# Patient Record
Sex: MALE | Race: WHITE
[De-identification: names, ages, dates, MRNs, and addresses within clinical notes are randomized per-mention and may not be internally consistent; named-entity substitution may affect disease eponyms.]

---

## 2020-09-03 ENCOUNTER — HOSPITAL ENCOUNTER (OUTPATIENT)
Dept: HOSPITAL 47 - RADMRIMAIN | Age: 55
Discharge: HOME | End: 2020-09-03
Attending: ORTHOPAEDIC SURGERY
Payer: COMMERCIAL

## 2020-09-03 DIAGNOSIS — M75.121: Primary | ICD-10-CM

## 2020-09-03 NOTE — MR
EXAMINATION TYPE: MR shoulder RT wo con

 

DATE OF EXAM: 9/3/2020 2:26 PM

 

COMPARISON: NONE

 

HISTORY: Right Shoulder Pain

 

TECHNIQUE: Multiplanar multispin echo imaging of the right shoulder was performed.

 

FINDINGS:

Rotator cuff : There is complete retracted tear of the supraspinatus tendon which has a chronic appea
mario. There is atrophy of the supraspinatus musculature. There is elevation of the humeral head rela
tive to the central glenoid axis. Marked narrowing subacromial space. Partial tear infraspinatus tend
on.

 

Bursa: No bursal effusion or thickening is seen.

 

Musculature: No muscular tear identified.

 

Acromioclavicular joint : Marked narrowing subacromial joint space. Subacromial spurring. Moderate AC
 joint arthropathy.

 

Osseous structures : There are no fractures or regions of abnormal bone marrow signal intensity.

 

Long biceps tendon : The biceps tendon is normally situated within the bicipital groove. No complete 
or partial biceps tendon tear is present.

 

Glenohumeral Joint fluid : There is no glenohumeral joint effusion.

 

Cartilage and Bone : No focal hyaline cartilage defects are noted. No Hill-Sachs, reverse Hill-Sachs,
 or bony Bankart lesions are seen.

 

Labrum : There are no SLAP or soft tissue Bankart lesions. No paralabral cysts are seen.

 

OTHER FINDINGS : none

 

IMPRESSION:

 

1. 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

## 2020-09-11 ENCOUNTER — HOSPITAL ENCOUNTER (OUTPATIENT)
Dept: HOSPITAL 47 - LABPAT | Age: 55
Discharge: HOME | End: 2020-09-11
Attending: ORTHOPAEDIC SURGERY
Payer: COMMERCIAL

## 2020-09-11 DIAGNOSIS — Z01.812: ICD-10-CM

## 2020-09-11 DIAGNOSIS — Z01.818: Primary | ICD-10-CM

## 2020-09-11 DIAGNOSIS — M17.11: ICD-10-CM

## 2020-09-11 DIAGNOSIS — Z79.01: ICD-10-CM

## 2020-09-11 DIAGNOSIS — Z51.81: ICD-10-CM

## 2020-09-11 LAB
ANION GAP SERPL CALC-SCNC: 6 MMOL/L
BASOPHILS # BLD AUTO: 0.1 K/UL (ref 0–0.2)
BASOPHILS NFR BLD AUTO: 2 %
CHLORIDE SERPL-SCNC: 104 MMOL/L (ref 98–107)
CO2 SERPL-SCNC: 27 MMOL/L (ref 22–30)
EOSINOPHIL # BLD AUTO: 0.1 K/UL (ref 0–0.7)
EOSINOPHIL NFR BLD AUTO: 2 %
ERYTHROCYTE [DISTWIDTH] IN BLOOD BY AUTOMATED COUNT: 4.99 M/UL (ref 4.3–5.9)
ERYTHROCYTE [DISTWIDTH] IN BLOOD: 12.3 % (ref 11.5–15.5)
HCT VFR BLD AUTO: 47.3 % (ref 39–53)
HGB BLD-MCNC: 15.6 GM/DL (ref 13–17.5)
INR PPP: 1 (ref ?–1.2)
LYMPHOCYTES # SPEC AUTO: 1.9 K/UL (ref 1–4.8)
LYMPHOCYTES NFR SPEC AUTO: 28 %
MCH RBC QN AUTO: 31.2 PG (ref 25–35)
MCHC RBC AUTO-ENTMCNC: 33 G/DL (ref 31–37)
MCV RBC AUTO: 94.7 FL (ref 80–100)
MONOCYTES # BLD AUTO: 0.4 K/UL (ref 0–1)
MONOCYTES NFR BLD AUTO: 5 %
NEUTROPHILS # BLD AUTO: 4.2 K/UL (ref 1.3–7.7)
NEUTROPHILS NFR BLD AUTO: 61 %
PLATELET # BLD AUTO: 294 K/UL (ref 150–450)
POTASSIUM SERPL-SCNC: 4.3 MMOL/L (ref 3.5–5.1)
PT BLD: 10.2 SEC (ref 9–12)
SODIUM SERPL-SCNC: 137 MMOL/L (ref 137–145)
WBC # BLD AUTO: 6.8 K/UL (ref 3.8–10.6)

## 2020-09-11 PROCEDURE — 80051 ELECTROLYTE PANEL: CPT

## 2020-09-11 PROCEDURE — 87070 CULTURE OTHR SPECIMN AEROBIC: CPT

## 2020-09-11 PROCEDURE — 93005 ELECTROCARDIOGRAM TRACING: CPT

## 2020-09-11 PROCEDURE — 85025 COMPLETE CBC W/AUTO DIFF WBC: CPT

## 2020-09-11 PROCEDURE — 85610 PROTHROMBIN TIME: CPT

## 2020-09-23 ENCOUNTER — HOSPITAL ENCOUNTER (OUTPATIENT)
Dept: HOSPITAL 47 - LABPAT | Age: 55
Discharge: HOME | End: 2020-09-23
Attending: ORTHOPAEDIC SURGERY
Payer: COMMERCIAL

## 2020-09-23 DIAGNOSIS — Z01.818: Primary | ICD-10-CM

## 2020-09-23 DIAGNOSIS — M17.11: ICD-10-CM

## 2020-09-23 PROCEDURE — 93005 ELECTROCARDIOGRAM TRACING: CPT

## 2020-09-29 NOTE — HP
HISTORY AND PHYSICAL



DATE OF SURGERY:

09/30/2020



Fawad Lawrence is a 54-year-old patient seen with progressive right knee pain.  We

discussed options.  He elected to proceed with right total knee arthroplasty.  Consent

regarding procedure was obtained.



PAST MEDICAL HISTORY:

Hypertension.



PAST SURGICAL HISTORY:

Right knee arthroscopy.



DAILY MEDICATIONS:

None.



ALLERGIES:

None.



SOCIAL HISTORY:

Denies tobacco use.



PHYSICAL EVALUATION OF THE RIGHT KNEE:

Range of motion is -2/3 to 130.  Mild effusion.  Tenderness medial joint line.

Crepitus medial patellofemoral compartments with range of motion.  Pain with

patellofemoral compression.  Ligaments stable.  Hip rotation without pain.  His distal

neurovascular exam is intact.



RADIOGRAPHS:

Of the right knee reveal severe osteoarthritic changes.



IMPRESSION:

Right knee osteoarthritis.



PLAN:

Right total knee arthroplasty.





MMODL / IJN: [id number] / Job#: [id number]

## 2020-09-30 ENCOUNTER — HOSPITAL ENCOUNTER (OUTPATIENT)
Dept: HOSPITAL 47 - OR | Age: 55
Discharge: HOME HEALTH | End: 2020-09-30
Attending: ORTHOPAEDIC SURGERY
Payer: COMMERCIAL

## 2020-09-30 VITALS — SYSTOLIC BLOOD PRESSURE: 107 MMHG | HEART RATE: 56 BPM | DIASTOLIC BLOOD PRESSURE: 69 MMHG | RESPIRATION RATE: 18 BRPM

## 2020-09-30 VITALS — TEMPERATURE: 97.9 F

## 2020-09-30 VITALS — BODY MASS INDEX: 26.6 KG/M2

## 2020-09-30 DIAGNOSIS — I10: ICD-10-CM

## 2020-09-30 DIAGNOSIS — M17.11: Primary | ICD-10-CM

## 2020-09-30 PROCEDURE — 73560 X-RAY EXAM OF KNEE 1 OR 2: CPT

## 2020-09-30 PROCEDURE — 64448 NJX AA&/STRD FEM NRV NFS IMG: CPT

## 2020-09-30 PROCEDURE — 97161 PT EVAL LOW COMPLEX 20 MIN: CPT

## 2020-09-30 PROCEDURE — 97110 THERAPEUTIC EXERCISES: CPT

## 2020-09-30 PROCEDURE — 88300 SURGICAL PATH GROSS: CPT

## 2020-09-30 PROCEDURE — 76942 ECHO GUIDE FOR BIOPSY: CPT

## 2020-09-30 PROCEDURE — 27447 TOTAL KNEE ARTHROPLASTY: CPT

## 2020-09-30 RX ADMIN — HYDROMORPHONE HYDROCHLORIDE ONE MG: 1 INJECTION, SOLUTION INTRAMUSCULAR; INTRAVENOUS; SUBCUTANEOUS at 10:11

## 2020-09-30 RX ADMIN — HYDROMORPHONE HYDROCHLORIDE ONE MG: 1 INJECTION, SOLUTION INTRAMUSCULAR; INTRAVENOUS; SUBCUTANEOUS at 10:05

## 2020-09-30 NOTE — P.OP
Date of Procedure: 09/30/20


Preoperative Diagnosis: 


Right knee osteoarthritis


Postoperative Diagnosis: 


Right knee osteoarthritis


Procedure(s) Performed: 


Right total knee arthroplasty


Implants: 


1.  Depuy attune size 8 right cruciate retaining cemented femur


2.  Depuy attune size 9 fixed bearing cemented tibial baseplate


3.  Depuy attune size 8 fixed bearing cruciate retaining 8 millimeter 

polyethylene tibial insert


4.  Depuy attune 41 mm all polyethylene cemented patella





Anesthesia: GETA, regional (Adductor canal catheter), local


Surgeon: Beni Medina


Assistant #1: Joselito Brown


Estimated Blood Loss (ml): 50


Pathology: other (Bone)


Condition: stable


Disposition: PACU


Indications for Procedure: 


54-year-old patient seen with symptomatic right knee osteoarthritis.  After 

treatment options were discussed, he elected to proceed with total knee 

arthroplasty.


Operative Findings: 


See description of procedure


Description of Procedure: 


Patient was taken to the operative suite after having an adductor canal catheter

placed by the department of anesthesia.  Patient underwent a general anesthetic 

by the department of anesthesia.  Patient was given preoperative IV intake 

antibiotics and TXA.  A well-padded tourniquet was placed about the right lower 

extremity.  The lower extremity was then prepped and draped in the normal 

sterile orthopedic fashion.  The extremity was elevated, a tourniquet was 

insufflated to 300.  A standard anterior incision was made sharply through skin.

 Dissection was taken down through the subcutaneous soft tissues down to the 

extensor mechanism.  A medial arthrotomy was performed, patella was everted and 

knee was flexed.  There was advanced osteoarthritis noted.  I introduced my 

distal intramedullary femoral drill.  I then introduced the distal femoral cut

ting jig.  Artur NOLASCO secured the cutting jig with 2 pins.  I held 

retractors in position while Artur NOLASCO performed the distal femoral 

resection through the guide area we now removed her distal femoral cutting 

guide.  We now placed our 4-in-1 femoral cutting block and positioned and it was

secured with 2 pins by Artur NOLASCO while I held the block in position.  The 

distal femoral finishing was now completed.  A proximal tibial cutting guide was

positioned.  I held the guide in the appropriate position with both hands well 

Artur NOLASCO inserted stabilizing pins into the guide.  Proximal tibial cut 

was made. We now placed a trial femoral component into position, along with an 

appropriate size tibial tray and insert.  We now took the knee through range of 

motion and had full extension good flexion and good overall soft tissue balance 

noted.  The patella was everted and stabilized with 2 towel clips held by Artur NOLASCO while I performed a flush with patellar quad tendon utilizing a fresh 

sawblade.  We templated the patella, appropriate drill holes were made.  An 

appropriate trial patella was positioned, knee was taken through full range of 

motion with the patella tracking very nicely.  The trial patella was removed.  

Drill holes were made through the femoral component.  All trial components were 

removed after marking off the appropriate rotation of the tibia.  Retractors 

were now positioned along the proximal tibia.  An appropriate keel punch was 

made with the appropriate size tibial guide by myself on Artur NOLASCO assisted

by holding retractors.  At this point appropriate size implants were chosen and 

opened.  The joint was irrigated copiously with pulse lavage mechanical 

irrigation.  The posterior capsule was infiltrated with local analgesic.  The 

wound was irrigated with pulse lavage mechanical irrigation.  We mixed 

antibiotic methylmethacrylate.  We placed the knee into flexion.  We placed 

multiple retractors assisted by Artur NOLASCO to expose the proximal tibia.  

Once the methyl methacrylate was ready, the tibial component was cemented into 

place removing any excess methylmethacrylate form by both myself and Artur NOLASCO.  The femoral component was cemented into place removing the removing any 

excess methylmethacrylate performed by both myself and Artur NOLASCO.  We then 

inserted the appropriate size polyethylene tibial insert.  We made sure that it 

was locked into position.  We took the knee into full extension, and then back 

in a flexion making sure we had removed any excess methylmethacrylate.  The 

patellar component was then cemented down and secured with clamp.  Excess 

methylmethacrylate removed.  We kept the knee in full extension, patellar clamp 

in position until methylmethacrylate had hardened.  Once it had hardened the 

patellar clamp was removed.  The knee was taken through full range of motion.  

The patella tracked nicely.  There was good soft tissue balancing.  The 

tourniquet was now released.  Additional hemostasis was achieved via 

electrocautery.  A second gram of TXA was given.  The wound again was irrigated 

with pulse lavage mechanical irrigation.  The superficial soft tissues were 

infiltrated local analgesic.  The extensor mechanism was repaired with Vicryl.  

We checked the repair with range of motion and it was stable.  The subcutaneous 

soft tissues were repaired with Vicryl in layers.  The skin was approximated 

with pernio/Dermabond.  Sterile dressings were applied followed by loose web 

roll and Ace bandage.  The patient was transferred to a bed, and taken to 

recovery in stable and satisfactory condition.  Artur NOLASCO assisted with 

this complex procedure.

## 2020-09-30 NOTE — P.ANPRN
Procedure Note - Anesthesia





- Nerve Block Performed


  ** Right Adductor Canal


Time Out Performed: Yes (07:03)


Date of Procedure: 09/30/20


Procedure Start Time: 07:03


Procedure Stop Time: 07:18


Location of Patient: PreOp


Indication: Acute Post-Operative Pain, Requested by Surgeon (Dr Medina)


Sedation Type: Sedate with meaningful contact maintained


Preparation: Sterile Prep, Sterile Dressing


Position: Supine


Catheter: Indwelling


Needle Types: On-Q


Needle Gauge: 21


Ultrasound used to visualize needle placement: Yes


Ultrasound used to observe medication spread: Yes


Injectate: 0.5% Ropivacaine (see comment for volume) (22cc)


Blood Aspirated: No


Pain Paresthesia on Injection Noted: No


Resistance on Injection: Normal


Image Stored and Saved: Yes


Events: Uneventful and Well Tolerated

## 2020-09-30 NOTE — XR
EXAMINATION TYPE: XR knee limited RT

 

DATE OF EXAM: 9/30/2020

 

CLINICAL HISTORY: Right knee pain and arthritis status post total knee replacement.

 

TECHNIQUE:  Portable AP and crosstable lateral views of the right knee are obtained immediately posto
peratively.

 

COMPARISON: Outside right knee x-ray August 19, 2020

 

FINDINGS:  Metallic hardware from total right knee arthroplasty is seen and appears satisfactory in a
lignment and position.  There is evidence of recent surgery with diffuse subcutaneous gas and soft ti
ssue swelling noted. 

 

IMPRESSION:  METALLIC HARDWARE FROM TOTAL RIGHT KNEE ARTHROPLASTY IS SATISFACTORY IN ALIGNMENT.

## 2020-12-16 NOTE — HP
HISTORY AND PHYSICAL



DATE OF SERVICE:

12/17/2020



Fawad Lawrence is a 55-year-old patient seen with progressive right shoulder pain.  We

discussed options for treatment.  He elected to proceed with arthroscopy.  Consent was

obtained.



PAST MEDICAL HISTORY:

Hypertension.



PAST SURGICAL HISTORY:

Knee arthroscopy.



DAILY MEDICATIONS:

Unknown.



SOCIAL HISTORY:

Denies tobacco use.



PHYSICAL EVALUATION OF THE RIGHT SHOULDER:

Flexion is 170, abduction is 160, external rotation 60 with significant weakness,

tenderness along the anterolateral acromion rotator cuff insertion site.  Impingement

is positive at 100.  Drop-arm sign is positive.  Distal neurovascular exam is intact.



RADIOGRAPHS:

Right shoulder type 2 anterior acromion, acromioclavicular joint osteoarthritis and

cystic changes of the tuberosity.  A right shoulder MRI revealed retracted rotator cuff

tendon tear.



IMPRESSION:

1. Right shoulder impingement with rotator cuff tear.

2. Right shoulder acromioclavicular joint osteoarthritis.

3. Hypertension.



PLAN:

Right shoulder arthroscopy, subacromial decompression, arthroscopic rotator cuff

repair, arthroscopic Rupinder procedure and debridement.





MMODL / IJN: [id number] / Job#: [id number]

## 2020-12-17 ENCOUNTER — HOSPITAL ENCOUNTER (OUTPATIENT)
Dept: HOSPITAL 47 - OR | Age: 55
Discharge: HOME | End: 2020-12-17
Attending: ORTHOPAEDIC SURGERY
Payer: COMMERCIAL

## 2020-12-17 VITALS — RESPIRATION RATE: 16 BRPM | DIASTOLIC BLOOD PRESSURE: 84 MMHG | SYSTOLIC BLOOD PRESSURE: 117 MMHG

## 2020-12-17 VITALS — HEART RATE: 65 BPM

## 2020-12-17 VITALS — TEMPERATURE: 96.8 F

## 2020-12-17 VITALS — BODY MASS INDEX: 25.8 KG/M2

## 2020-12-17 DIAGNOSIS — Z79.899: ICD-10-CM

## 2020-12-17 DIAGNOSIS — Z96.651: ICD-10-CM

## 2020-12-17 DIAGNOSIS — Z79.82: ICD-10-CM

## 2020-12-17 DIAGNOSIS — M75.41: ICD-10-CM

## 2020-12-17 DIAGNOSIS — M19.011: ICD-10-CM

## 2020-12-17 DIAGNOSIS — I10: ICD-10-CM

## 2020-12-17 DIAGNOSIS — M75.101: Primary | ICD-10-CM

## 2020-12-17 DIAGNOSIS — M94.211: ICD-10-CM

## 2020-12-17 PROCEDURE — 29826 SHO ARTHRS SRG DECOMPRESSION: CPT

## 2020-12-17 PROCEDURE — 29824 SHO ARTHRS SRG DSTL CLAVICLC: CPT

## 2020-12-17 PROCEDURE — 64415 NJX AA&/STRD BRCH PLXS IMG: CPT

## 2020-12-17 PROCEDURE — 29827 SHO ARTHRS SRG RT8TR CUF RPR: CPT

## 2020-12-17 PROCEDURE — 76942 ECHO GUIDE FOR BIOPSY: CPT

## 2020-12-17 NOTE — P.OP
Date of Procedure: 12/17/20


Preoperative Diagnosis: 


Right shoulder impingement


Postoperative Diagnosis: 


1.  Right shoulder rotator cuff tear


2.  Right shoulder impingement


3.  Right shoulder acromioclavicular joint osteoarthritis





Procedure(s) Performed: 


1.  Right shoulder arthroscopic rotator cuff repair


2.  Right shoulder arthroscopic subacromial decompression


3.  Right shoulder arthroscopic Rupinder procedure





Implants: 


6Arthrex 4.75 swivel lock anchors


Anesthesia: GETA, regional (Interscalene block)


Surgeon: Beni Medina


Assistant #1: Joselito Brown


Estimated Blood Loss (ml): 11


Pathology: none sent


Condition: stable


Disposition: PACU


Indications for Procedure: 


55-year-old patient seen with progressive right shoulder pain.  After treatment 

options were discussed, he elected to proceed with arthroscopy.


Operative Findings: 


See description of procedure


Description of Procedure: 








 Patient underwent an interscalene block by department of anesthesia.  The 

patient was then taken to the operative suite.  The patient underwent a general 

anesthetic by the department of anesthesia.  The patient was placed into a 

lateral position and secured.  There was appropriate padding of the bony 

prominence.  Right shoulder was then prepped and draped in normal sterile 

orthopedic fashion.  We placed the extremity in 10 pounds of longitudinal 

traction. A posterior incision was now made for a posterior working portal site.

The trocar and cannula were inserted into the glenohumeral joint. Arthroscopy 

was initiated. Spinal needle was now inserted anteriorly, to ascertain the 

anterior working portal site.  An incision was now made in that area, a trocar 

was inserted followed by a probe.  The biceps tendon was absent.  There was a 

massive rotator cuff tear visualized from the glenohumeral joint.  There were 

grade 2 chondromalacia changes diffusely about the glenohumeral joint.  The 

labrum showed some degeneration but no tears.  Instruments now removed from 

glenohumeral joint.


Utilizing the posterior working portal site, the trocar and cannula were 

inserted into the subacromial space. Arthroscopy initiated. I made an incision 2

fingerbreadths lateral to the acromion. I introduced my trocar followed by my 

ArthroCare ablator. I now began ablating thick subacromial bursal tissue, which 

exposed the undersurface of the anterior acromion.  There was diminished 

subacromial space. There was a very prominent anterior acromion. A motorized bur

was introduced and a subacromial decompression was performed. I also excised 

some osteophytes off the inferior aspect of the distal clavicle. The AC joint 

was visualized and noted to be fairly arthritic.  The motorized bur was 

introduced in the anterior portal site and a Rupinder procedure was performed 

without difficulty, decompressing the AC joint nicely. I turned my attention to 

the rotator cuff. There was a 4 cm rotator cuff tear.  It was a massive tear 

with retraction to the glenoid.  I meticulously released the tendon I was able 

to finally pulled over the footprint.  I debrided the margins getting down to 

stable tendon tissue.  I introduced my motorized bur and abraded the footprint 

area, getting some petechial bleeding. I now made 2 accessory portal sites off 

the lateral aspect of the acromion. I punched a holes medial for medial row 

fixation with the assistance of Artur NOLASCO carefully tapping the punch with 

a mallet as I held the punch and the camera.  I now introduced both anchors into

the pre-punched holes and Artur NOLASCO tapped them with the mallet as I held 

anchors and the camera.  Artur NOLASCO now screwed the anchors in place a while

I held the anchor guide and camera.  All 12 limbs of suture were now passed 

through good bites of rotator cuff tendon.  I now punched 3 holes for lateral 

row fixation again I held the punch and camera while Artur NOLASCO used a 

mallet to tap in the punch.  We now passed sutures through both anchors and 

individually I introduced the anchors into the pre-punch holes I held the anchor

guide in position with one hand holding the camera with the other hand while 

Artur NOLASCO tensioned the sutures and screwed in the anchors one at a time.  

All residual suture limbs were now clipped.  We had good compression of the 

tendon along the entire footprint.  I injected 1 mL Renyte intra-articular.  

Instruments now removed from the portal sites. All portal sites were approx

imated with nylon suture. Sterile dressings were applied followed by a shoulder 

immobilizer.  Joselito NOLASCO assisted in this complex case.  The patient was

awakened, transferred to a bed, and taken to recovery in stable condition.

## 2020-12-17 NOTE — P.ANPRN
Procedure Note - Anesthesia





- Nerve Block Performed


  ** Right Interscalene Single


Time Out Performed: Yes


Date of Procedure: 12/17/20


Procedure Start Time: 06:54


Procedure Stop Time: 07:04


Location of Patient: PreOp


Indication: Acute Post-Operative Pain, Requested by Surgeon


Sedation Type: Sedate with meaningful contact maintained


Preparation: Sterile Prep


Position: Supine


Needle Types: Pajunk


Needle Gauge: 21


Ultrasound used to visualize needle placement: Yes


Ultrasound used to observe medication spread: Yes


Blood Aspirated: No


Pain Paresthesia on Injection Noted: No


Resistance on Injection: Normal


Image Stored and Saved: Yes


Events: Uneventful and Well Tolerated (ropi .5% 30cc plus dexamethasone 4mg)